# Patient Record
Sex: MALE | Race: WHITE
[De-identification: names, ages, dates, MRNs, and addresses within clinical notes are randomized per-mention and may not be internally consistent; named-entity substitution may affect disease eponyms.]

---

## 2020-01-04 ENCOUNTER — HOSPITAL ENCOUNTER (INPATIENT)
Dept: HOSPITAL 36 - GERO | Age: 70
LOS: 11 days | Discharge: NURSING, REHAB OR LONG-TERM CARE | DRG: 885 | End: 2020-01-15
Attending: PSYCHIATRY & NEUROLOGY | Admitting: PSYCHIATRY & NEUROLOGY
Payer: MEDICARE

## 2020-01-04 DIAGNOSIS — F29: ICD-10-CM

## 2020-01-04 DIAGNOSIS — Z59.0: ICD-10-CM

## 2020-01-04 DIAGNOSIS — F10.20: ICD-10-CM

## 2020-01-04 DIAGNOSIS — N40.0: ICD-10-CM

## 2020-01-04 DIAGNOSIS — L89.319: ICD-10-CM

## 2020-01-04 DIAGNOSIS — F33.2: Primary | ICD-10-CM

## 2020-01-04 DIAGNOSIS — K59.00: ICD-10-CM

## 2020-01-04 DIAGNOSIS — R45.851: ICD-10-CM

## 2020-01-04 PROCEDURE — Z7610: HCPCS

## 2020-01-04 PROCEDURE — X3904: HCPCS

## 2020-01-04 SDOH — ECONOMIC STABILITY - HOUSING INSECURITY: HOMELESSNESS: Z59.0

## 2020-01-05 NOTE — PROGRESS NOTES
DATE:  



HISTORY OF PRESENT ILLNESS:  The patient is a 69-year-old male transferred here

from [facility] on a 5150 for being danger to self.  The patient

endorsed suicidal ideations with plan to overdose on pills, cites a long history

of alcohol abuse and homelessness as primary source of distress.  The patient

states that he has been drinking for over 20 years and is unable to stop.



MENTAL STATUS EXAMINATION:

GENERAL APPEARANCE AND BEHAVIOR:  The patient is resting comfortably in bed, not

in acute distress.  Good eye contact, cooperative with interview.  Speech is

normal, rate, rhythm and tone.  Mood and affect:  The patient described his mood

as depressed and anxious.  Affect is congruent.  Thought process and thought

content linear and logical.  The patient reports to having suicidal ideations to

overdose on pills.  Insight and judgment is poor.



DIAGNOSTIC IMPRESSION:  Major depressive disorder, severe, recurrent, without

psychotic features and alcohol abuse.



PLAN:  We will recommend continuing the patient detox, monitor for any seizures

or DTs.





DD: 01/05/2020 17:20

DT: 01/05/2020 22:15

[facility]# [id number]  [id number]

## 2020-01-05 NOTE — HISTORY AND PHYSICAL
History of Present Illness





- HPI


Chief Complaint: 





Psychosis


HPI: 





68 y/o homeless man transferred to [facility] from 

[facility] for suicidal ideations, alcohol abuse, and 5150 hold, 

for danger to himself. While in the [facility] ER patient had 

initial labwork done





WBC 7.6


H/H 11.1/34.9


Plat 166K





Na  142


K 4.0


Bun 19


Cr 0.7


glu 96


AST 48


ALT 26


Alk zay 80


ETOH 375





CT head no acute bleed, deep white matter changes consistent with microvascular 

ischemic disease





Patient has a past medical history for bipolar disorder, alcoholism.


Vital Signs: 





 Last Vital Signs











Temp  99.1 F   01/05/20 06:12


 


Pulse  95   01/05/20 06:12


 


Resp  20   01/05/20 06:12


 


BP  134/81   01/05/20 06:12


 


Pulse Ox  96   01/05/20 06:12














Past Medical History


Cardiovascular: Report: No Pertinent Hx


Pulmonary: Report: No Pertinent Hx


CNS: Report: Dementia


Psych: Report: Anxiety, Bipolar


Musculoskeletal: Report: No Pertinent Hx


Rheumatologic: Report: No pertinent Hx


Infectious Disease: Report: No Pertinent Hx


Renal/: Report: No Pertinent Hx


Endocrine: Report: No Pertinent Hx


Dermatology: Report: No Pertinent Hx





- Past Surgical History


Past Surgical History: No pertinent Hx





Family Medical History





- Family Member


  ** Mother


History Unknown: Yes





  ** Father


History Unknown: Yes





Social History


Smoke: No


Alcohol: Heavy


Drugs: None


Lives: Homeless





- Allergies


Allergies/Adverse Reactions: 


 Allergies











Allergy/AdvReac Type Severity Reaction Status Date / Time


 


No Known Allergies Allergy   Verified 01/05/20 01:44














Review of Systems





- Review of Systems


Constitutional: Report: No Significant


Eyes: Report: No Significant


ENT: Report: No Significant


Respiratory: Report: No Significant


Cardiovascular: Report: No Significant


Gastrointestinal: Report: No Significant


Genitourinary: Report: No Significant


Musculoskeletal: Report: No Significant


Skin: Report: No Significant


Neurological: Report: No Significant





Physical Exam





- Physical Exam


HEENT: Report: Ears Nose Throat within normal limits, Pharnyx within normal 

limits


Neck: Report: Within normal limits


Cardiovascular Systems: Report: +s1/s2 noted, Regular, Rate and Rhythm


Respiratory: Report: Breath Sounds are within normal limits


Abdomen: Report: Non-tender to palpation


Back: Report: Inspection of back is within normal limits.


Extremities: Report: Non-tender to palpation.


Skin: Report: Color of skin is within normal limits


Neuro/Psych: Report: Mood affect is within normal limits





- Assessment


Assessment: 





5150


homeless


alcoholism


suicidal ideation


bipolar disorder


psychosis





- Plan


Plan: 





admit to [facility]

## 2020-01-06 RX ADMIN — CLONIDINE SCH PATCH: 0.1 PATCH TRANSDERMAL at 08:44

## 2020-01-06 NOTE — GENERAL PROGRESS NOTE
Subjective





- Review of Systems


Service Date: 01/06/20


Subjective: 





Patient is awake, alert. No acute distress. 





VS


T98.4


P 92


/86


R 20





Objective





- Physical Exam


Vitals and I&O: 


 Vital Signs











Temp  98.4 F   01/06/20 06:11


 


Pulse  92   01/06/20 06:11


 


Resp  20   01/06/20 06:11


 


BP  146/86   01/06/20 06:11


 


Pulse Ox  94   01/06/20 06:11








 Intake & Output











 01/05/20 01/06/20 01/06/20





 18:59 06:59 18:59


 


Intake Total 950 240 


 


Balance 950 240 


 


Intake:   


 


  Oral 950 240 


 


Other:   


 


  # Voids 3 2 


 


  # Bowel Movements 0  











Active Medications: 


Current Medications





Acetaminophen (Tylenol)  650 mg PO Q4HR PRN


   PRN Reason: Mild Pain / Temp above 100


   Stop: 03/05/20 01:44


Al Hydrox/Mg Hydrox/Simethicone (Maalox)  30 ml PO Q4HR PRN


   PRN Reason: GI DISTRESS


   Stop: 03/05/20 01:44


Lorazepam (Ativan)  0.5 mg PO Q4HR PRN; Protocol


   PRN Reason: Anxiety


   Stop: 02/04/20 01:44


   Last Admin: 01/05/20 08:26 Dose:  0.5 mg


Magnesium Hydroxide (Milk Of Magnesia)  30 ml PO HS PRN


   PRN Reason: Constipation


Multivitamins/Vitamin C (Theragran)  1 tab PO DAILY LUIS


   Stop: 03/05/20 08:59


   Last Admin: 01/05/20 08:26 Dose:  1 tab


Zolpidem Tartrate (Ambien)  5 mg PO HS PRN


   PRN Reason: Insomnia


   Stop: 03/05/20 01:44


   Last Admin: 01/05/20 20:31 Dose:  5 mg











Assessment/Plan





- Assessment


Assessment: 





5150


homeless


alcoholism


suicidal ideation


bipolar disorder


psychosis


BP elevated





- Plan


Plan: 





admit to [facility]





will add catapres patch

## 2020-01-07 NOTE — PROGRESS NOTES
DATE:  01/06/2020



SUBJECTIVE:  A 69-year-old male transferred from [facility],

endorsed suicidal ideations, plan to overdose on the pills.  A long history of

alcohol abuse, homelessness, drinking for over 20 years, concerns he may be

withdrawing from alcohol.  The patient very depressed ____ history of bipolar,

alcoholism, homelessness, very poor sleep for 3 weeks prior to coming into the

hospital, little family support.  Currently has an adult son.



No vitals were noted.  He does not seem to be having any tremors right now. 

Pulse rate is relatively stable, currently also on dosing of Ativan.  He does

not exactly know how much he was drinking, is a little bit unclear.



ASSESSMENT:  Ongoing suicidal ideation, despairing, suicidal, monitoring also

for any withdrawal symptoms from alcohol.  Continue Ativan as needed and the

clonidine patch.





DD: 01/06/2020 13:57

DT: 01/06/2020 21:13

JOB# [id number]  [id number]

## 2020-01-07 NOTE — PROGRESS NOTES
DATE:  01/07/2020



SUBJECTIVE:  The patient is still withdrawing from alcohol.  Mild tremors. 

Still depressed, hopeless, despairing, ongoing SI, psychological distress,

turmoil, mostly keeps to self, withdrawn.  We will initiate a 14-day hold.



ASSESSMENT:  The patient is homeless, despairing, ongoing symptoms, safety

concerns.





DD: 01/07/2020 15:42

DT: 01/07/2020 20:54

[facility]# [id number]  [id number]

## 2020-01-07 NOTE — GENERAL PROGRESS NOTE
Subjective





- Review of Systems


Service Date: 01/07/20


Subjective: 





Patient is awake, alert. No acute distress. 





VS


T98.6


P 81


/78


R 20





Objective





- Physical Exam


Vitals and I&O: 


 Vital Signs











Temp  98.6 F   01/07/20 05:48


 


Pulse  81   01/07/20 05:48


 


Resp  20   01/07/20 05:48


 


BP  138/78   01/07/20 05:48


 


Pulse Ox  97   01/07/20 05:48








 Intake & Output











 01/06/20 01/07/20 01/07/20





 18:59 06:59 18:59


 


Intake Total  240 


 


Balance  240 


 


Intake:   


 


  Oral  240 


 


Other:   


 


  # Voids 1 2 


 


  # Bowel Movements 2  











Active Medications: 


Current Medications





Acetaminophen (Tylenol)  650 mg PO Q4HR PRN


   PRN Reason: Mild Pain (Scale 1-3)


   Stop: 03/05/20 01:44


   Last Admin: 01/06/20 18:05 Dose:  650 mg


Acetaminophen (Tylenol)  650 mg PO Q4HR PRN


   PRN Reason: TEMP ABOVE 100


   Stop: 03/06/20 11:33


Al Hydrox/Mg Hydrox/Simethicone (Maalox)  30 ml PO Q4HR PRN


   PRN Reason: GI DISTRESS


   Stop: 03/05/20 01:44


Clonidine HCl (Catapres-Tts-1)  1 patch TD Mo LUIS


   Stop: 03/06/20 08:59


   Last Admin: 01/06/20 08:44 Dose:  1 patch


Lorazepam (Ativan)  0.5 mg PO Q4HR PRN; Protocol


   PRN Reason: Anxiety


   Stop: 02/04/20 01:44


   Last Admin: 01/06/20 21:27 Dose:  0.5 mg


Magnesium Hydroxide (Milk Of Magnesia)  30 ml PO HS PRN


   PRN Reason: Constipation


Multivitamins/Vitamin C (Theragran)  1 tab PO DAILY LUIS


   Stop: 03/05/20 08:59


   Last Admin: 01/06/20 08:44 Dose:  1 tab


Trazodone HCl (Desyrel)  50 mg PO HS LUIS; Protocol


   Stop: 03/06/20 20:59


   Last Admin: 01/06/20 21:27 Dose:  50 mg








General: Alert, Oriented x3, No acute distress


HEENT: Atraumatic, PERRLA, EOMI


Neck: Supple


Cardiovascular: Regular rate


Lungs: Clear to auscultation


Abdomen: Bowel sounds, Soft, no Distended


Extremities: no Clubbing, no Cyanosis, no Edema





Assessment/Plan





- Assessment


Assessment: 





5150


homeless


alcoholism


suicidal ideation


bipolar disorder


psychosis


BP improved





- Plan


Plan: 





admit to [facility]





will add catapres patch

## 2020-01-08 NOTE — GENERAL PROGRESS NOTE
Subjective





- Review of Systems


Service Date: 01/08/20


Subjective: 





Patient is awake, alert. No acute distress. 





VS


T98.6


P 83


/80


R 18





Objective





- Physical Exam


Vitals and I&O: 


 Vital Signs











Temp  98.7 F   01/08/20 06:30


 


Pulse  83   01/08/20 06:30


 


Resp  18   01/08/20 06:30


 


BP  143/80   01/08/20 06:30


 


Pulse Ox  96   01/08/20 06:30








 Intake & Output











 01/07/20 01/08/20 01/08/20





 18:59 06:59 18:59


 


Intake Total 800 120 


 


Balance 800 120 


 


Intake:   


 


  Oral 800 120 


 


Other:   


 


  # Voids  3 











Active Medications: 


Current Medications





Acetaminophen (Tylenol)  650 mg PO Q4HR PRN


   PRN Reason: Mild Pain (Scale 1-3)


   Stop: 03/05/20 01:44


   Last Admin: 01/07/20 14:58 Dose:  650 mg


Acetaminophen (Tylenol)  650 mg PO Q4HR PRN


   PRN Reason: TEMP ABOVE 100


   Stop: 03/06/20 11:33


Al Hydrox/Mg Hydrox/Simethicone (Maalox)  30 ml PO Q4HR PRN


   PRN Reason: GI DISTRESS


   Stop: 03/05/20 01:44


Clonidine HCl (Catapres-Tts-1)  1 patch TD Mo LUIS


   Stop: 03/06/20 08:59


   Last Admin: 01/06/20 08:44 Dose:  1 patch


Lorazepam (Ativan)  0.5 mg PO Q4HR PRN; Protocol


   PRN Reason: Anxiety


   Stop: 02/04/20 01:44


   Last Admin: 01/06/20 21:27 Dose:  0.5 mg


Magnesium Hydroxide (Milk Of Magnesia)  30 ml PO HS PRN


   PRN Reason: Constipation


Multivitamins/Vitamin C (Theragran)  1 tab PO DAILY LUIS


   Stop: 03/05/20 08:59


   Last Admin: 01/07/20 08:54 Dose:  1 tab


Trazodone HCl (Desyrel)  50 mg PO HS LUIS; Protocol


   Stop: 03/06/20 20:59


   Last Admin: 01/07/20 20:18 Dose:  50 mg








General: Alert, Oriented x3, No acute distress


HEENT: Atraumatic, PERRLA, EOMI


Neck: Supple


Cardiovascular: Regular rate


Lungs: Clear to auscultation


Abdomen: Bowel sounds, Soft, no Distended


Extremities: no Clubbing, no Cyanosis, no Edema





Assessment/Plan





- Assessment


Assessment: 





5150


homeless


alcoholism


suicidal ideation


bipolar disorder


psychosis


BP elevated today





- Plan


Plan: 





admit to [facility]





will add catapres patch and clonidine PO q8 PRN SBP above 150

## 2020-01-09 NOTE — PROGRESS NOTES
DATE:  



The patient seen, chart reviewed, discussed with staff.  The patient seems to be

withdrawing from alcohol without any complications.  No overt tremors noted. 

Still feeling "weak," erratic sleep, feeling somewhat anxious, also anxious

that he has a medical problem, is homeless, nowhere to go, having a hard time

walking.  Mostly withdrawn, keeps to self, ongoing symptoms, psychological

distress, turmoil.  The patient noting he has a history of taking Lamictal,

BuSpar.  We will continue to monitor.  The patient will likely need a place to

go.  Also having a hard time sleeping.  We will make appropriate medication

adjustments.





DD: 01/08/2020 17:34

DT: 01/09/2020 03:07

JOB# [id number]  [id number]

## 2020-01-09 NOTE — GENERAL PROGRESS NOTE
Subjective





- Review of Systems


Service Date: 20


Subjective: 





Patient is awake, alert. No acute distress. Currently on wound care treatment. 

Poor PO intake 





VS


T97.4


P 76


/78


R 18





Objective





- Physical Exam


Vitals and I&O: 


 Vital Signs











Temp  97.4 F   20 06:47


 


Pulse  76   20 06:47


 


Resp  18   20 06:47


 


BP  139/78   20 06:47


 


Pulse Ox  95   20 06:47








 Intake & Output











 20





 18:59 06:59 18:59


 


Intake Total  240 


 


Output Total  1 


 


Balance  239 


 


Intake:   


 


  Oral  240 


 


Output:   


 


  Urine/Stool Mix  1 


 


Other:   


 


  # Voids  2 











Active Medications: 


Current Medications





Acetaminophen (Tylenol)  650 mg PO Q4HR PRN


   PRN Reason: Mild Pain (Scale 1-3)


   Stop: 20 01:44


   Last Admin: 20 21:41 Dose:  650 mg


Acetaminophen (Tylenol)  650 mg PO Q4HR PRN


   PRN Reason: TEMP ABOVE 100


   Stop: 20 11:33


Al Hydrox/Mg Hydrox/Simethicone (Maalox)  30 ml PO Q4HR PRN


   PRN Reason: GI DISTRESS


   Stop: 20 01:44


Clonidine HCl (Catapres-Tts-1)  1 patch TD Mo LUIS


   Stop: 20 08:59


   Last Admin: 20 08:44 Dose:  1 patch


Lorazepam (Ativan)  0.5 mg PO Q4HR PRN; Protocol


   PRN Reason: Anxiety


   Stop: 20 01:44


   Last Admin: 20 21:27 Dose:  0.5 mg


Magnesium Hydroxide (Milk Of Magnesia)  30 ml PO HS PRN


   PRN Reason: Constipation


Multivitamins/Vitamin C (Theragran)  1 tab PO DAILY LUIS


   Stop: 20 08:59


   Last Admin: 20 08:14 Dose:  1 tab


Trazodone HCl (Desyrel)  50 mg PO HS LUIS; Protocol


   Stop: 20 20:59


   Last Admin: 20 20:45 Dose:  50 mg








General: Alert, Oriented x3, No acute distress


HEENT: Atraumatic, PERRLA, EOMI


Neck: Supple


Cardiovascular: Regular rate


Lungs: Clear to auscultation


Abdomen: Bowel sounds, Soft, no Distended


Extremities: no Clubbing, no Cyanosis, no Edema





Assessment/Plan





- Assessment


Assessment: 





5150


homeless


alcoholism


suicidal ideation


bipolar disorder


psychosis


BP elevated today


Pressure ulcers -- Right Buttocks





- Plan


Plan: 





[facility]





will add catapres patch and clonidine PO q8 PRN SBP above 150





Wound care treatment. will add doxycycline PO





Nutritional Asmnt/Malnutr-PDOC





- Dietary Evaluation


Malnutrition Findings (Please click <Entered> for more info): 








Nutritional Asmnt/Malnutrition                             Start:  20 09:

34


Text:                                                      Status: Complete    

  


Freq:                                                                          

  


Protocol:                                                                      

  


 Document     20 09:39  JERI  (Rec: 20 10:01  JERI OCONNELL-

FNS1)


 Nutritional Asmnt/Malnutrition


     Patient General Information


      Nutritional Screening                      Low Risk


      Diagnosis                                  Psychosis


      Pertinent Medical Hx/Surgical Hx           Dementia, anxietry, bipolar


      Subjective Information                     Per H&P, patient is homeless


                                                 and admitted from


                                                 [facility] for


                                                 suicidal ideations, alcohol


                                                 abuse and 5150 hold for danger


                                                 to himself. Per RN, patient


                                                 not eating much, will often


                                                 only eat a cookie. She states


                                                 he mostly just drinks alcohol


                                                 at home and doesn't eat much.


                                                 Patient requesting strawberry


                                                 flavored protein supplements.


      Current Diet Order/ Nutrition Support      Regular


      Patient / S.O                              Not Indicated


      Pertinent Medications                      maalox, MOM, Theragran


      Pertinent Labs                             No current labs


     Nutritional Hx/Data


      Height                                     1.68 m


      Height (Calculated Centimeters)            167.6


      Current Weight (lbs)                       62.142 kg


      Weight (Calculated Kilograms)              62.1


      Weight (Calculated Grams)                  12875.2


      Ideal Body Weight                          142


      % Ideal Body Weight                        96


      Body Mass Index (BMI)                      22.1


      Recent Weight Change                       No


      Weight Status                              Approriate


     GI Symptoms


      GI Symptoms                                None


      Last BM                                    1/6 x 2


      Difficult in:                              None


      Food Allergies                             No


      Cultural/Ethnic/Sikh Belief           none indicated


      Usual diet at home                         regular (pt homeless)


      Skin Integrity/Comment:                    Anders Soliman, per wound care


                                                 notes, skin tear on right


                                                 outer buttock, right upper


                                                 pelvic rim and left upper


                                                 pelvic rim. Chronic wound on


                                                 right knee.


      Current %PO                                Poor (25-49%)


     Estimated Nutritional Goals


      BEE in Kcals:                              Using Current wt


      Calories/Kcals/Kg                          62.2kg 25-30 kcal/kg


      Kcals Calculated                           ~2083-1245 kcal/day


      Protein:                                   Using Current wt


      Protein g/k-1.2 gm/kg


      Protein Calculated                         60-75 gm/day


      Fluid: ml                                  ~0222-8146 ml/day (1 ml/kcal)


     Nutritional Problem


      1. Problem


       Problem                                   Inadequate oral intake related


                                                 to


       Etiology                                  possible poor appetite/


                                                 confusion aeb


       Signs/Symptoms:                           PO intake ~25% of meals


     Intervention/Recommendation


      Comments                                   1. Continue regular diet as


                                                 tolerated by patient


                                                 2. Encourage oral intake and


                                                 provide assistance with meals


                                                 as needed.


                                                 3. Provide Ensure Enlive (


                                                 Strawberry) with each meal to


                                                 optimize calorie/protein


                                                 intake.


                                                 4 Continue MVI supplementation


                                                 due to impaired skin


                                                 integrity.


     Expected Outcomes/Goals


      Expected Outcomes/Goals                    oral intake >75% of meals,


                                                 weight stable, nutrition


                                                 related labs WNL, improved


                                                 skin integrity


                                                 F/U MR

## 2020-01-10 NOTE — PROGRESS NOTES
DATE:  01/10/2020



SUBJECTIVE:  The patient in the hospital.  No alcohol withdrawal symptoms,

depressed, withdrawn, weak on exam.  We are working on placement, trying to get

him over to [facility].  Unclear if they can accept the patient.  The patient

really cannot take care of himself.  He is just frankly too weak.



PLAN:  We will continue to monitor ongoing concerns about his ability to take

care for his basic needs.





DD: 01/10/2020 13:28

DT: 01/10/2020 22:56

[facility]# [id number]  [id number]

## 2020-01-10 NOTE — GENERAL PROGRESS NOTE
Subjective





- Review of Systems


Service Date: 01/10/20


Subjective: 





Patient is awake, alert. No acute distress. Currently on wound care treatment. 

Poor PO intake 





VS


T98.6


P 83


/71


R 18





Objective





- Physical Exam


Vitals and I&O: 


 Vital Signs











Temp  98.6 F   01/10/20 06:21


 


Pulse  83   01/10/20 06:21


 


Resp  18   01/10/20 06:21


 


BP  146/71   01/10/20 06:21


 


Pulse Ox  99   01/10/20 06:21








 Intake & Output











 01/09/20 01/10/20 01/10/20





 18:59 06:59 18:59


 


Intake Total 1200 240 


 


Output Total  1 


 


Balance 1200 239 


 


Intake:   


 


  Oral 1200 240 


 


Output:   


 


  Urine/Stool Mix  1 


 


Other:   


 


  # Voids  1 


 


  # Bowel Movements  1 











Active Medications: 


Current Medications





Acetaminophen (Tylenol)  650 mg PO Q4HR PRN


   PRN Reason: Mild Pain (Scale 1-3)


   Stop: 20 01:44


   Last Admin: 20 17:09 Dose:  650 mg


Acetaminophen (Tylenol)  650 mg PO Q4HR PRN


   PRN Reason: TEMP ABOVE 100


   Stop: 20 11:33


Al Hydrox/Mg Hydrox/Simethicone (Maalox)  30 ml PO Q4HR PRN


   PRN Reason: GI DISTRESS


   Stop: 20 01:44


Clonidine HCl (Catapres-Tts-1)  1 patch TD Mo LUIS


   Stop: 20 08:59


   Last Admin: 20 08:44 Dose:  1 patch


Doxycycline Hyclate (Vibramycin)  100 mg PO Q12HR LUIS


   Stop: 20 08:59


   Last Admin: 20 21:45 Dose:  100 mg


Lorazepam (Ativan)  0.5 mg PO Q4HR PRN; Protocol


   PRN Reason: Anxiety


   Stop: 20 01:44


   Last Admin: 20 21:27 Dose:  0.5 mg


Magnesium Hydroxide (Milk Of Magnesia)  30 ml PO HS PRN


   PRN Reason: Constipation


Multivitamins/Vitamin C (Theragran)  1 tab PO DAILY LUIS


   Stop: 20 08:59


   Last Admin: 20 09:11 Dose:  1 tab


Trazodone HCl (Desyrel)  50 mg PO HS LUIS; Protocol


   Stop: 20 20:59


   Last Admin: 20 21:45 Dose:  50 mg








General: Alert, Oriented x3, No acute distress


HEENT: Atraumatic, PERRLA, EOMI


Neck: Supple


Cardiovascular: Regular rate


Lungs: Clear to auscultation


Abdomen: Bowel sounds, Soft, no Distended


Extremities: no Clubbing, no Cyanosis, no Edema





Assessment/Plan





- Assessment


Assessment: 





5150


homeless


alcoholism


suicidal ideation


bipolar disorder


psychosis


BP stable today


Pressure ulcers -- Right Buttocks





- Plan


Plan: 





admit to geropsyche





will add catapres patch and clonidine PO q8 PRN SBP above 150





Wound care treatment. will add doxycycline PO





Nutritional Asmnt/Malnutr-PDOC





- Dietary Evaluation


Malnutrition Findings (Please click <Entered> for more info): 








Nutritional Asmnt/Malnutrition                             Start:  20 09:

34


Text:                                                      Status: Complete    

  


Freq:                                                                          

  


Protocol:                                                                      

  


 Document     20 09:39  JERI  (Rec: 20 10:01  JERI OCONNELL-

FNS1)


 Nutritional Asmnt/Malnutrition


     Patient General Information


      Nutritional Screening                      Low Risk


      Diagnosis                                  Psychosis


      Pertinent Medical Hx/Surgical Hx           Dementia, anxietry, bipolar


      Subjective Information                     Per H&P, patient is homeless


                                                 and admitted from


                                                 [facility] for


                                                 suicidal ideations, alcohol


                                                 abuse and 5150 hold for danger


                                                 to himself. Per RN, patient


                                                 not eating much, will often


                                                 only eat a cookie. She states


                                                 he mostly just drinks alcohol


                                                 at home and doesn't eat much.


                                                 Patient requesting strawberry


                                                 flavored protein supplements.


      Current Diet Order/ Nutrition Support      Regular


      Patient / S.O                              Not Indicated


      Pertinent Medications                      maalox, MOM, Theragran


      Pertinent Labs                             No current labs


     Nutritional Hx/Data


      Height                                     1.68 m


      Height (Calculated Centimeters)            167.6


      Current Weight (lbs)                       62.142 kg


      Weight (Calculated Kilograms)              62.1


      Weight (Calculated Grams)                  36860.2


      Ideal Body Weight                          142


      % Ideal Body Weight                        96


      Body Mass Index (BMI)                      22.1


      Recent Weight Change                       No


      Weight Status                              Approriate


     GI Symptoms


      GI Symptoms                                None


      Last BM                                    1/6 x 2


      Difficult in:                              None


      Food Allergies                             No


      Cultural/Ethnic/Taoist Belief           none indicated


      Usual diet at home                         regular (pt homeless)


      Skin Integrity/Comment:                    Andres Soliman, per wound care


                                                 notes, skin tear on right


                                                 outer buttock, right upper


                                                 pelvic rim and left upper


                                                 pelvic rim. Chronic wound on


                                                 right knee.


      Current %PO                                Poor (25-49%)


     Estimated Nutritional Goals


      BEE in Kcals:                              Using Current wt


      Calories/Kcals/Kg                          62.2kg 25-30 kcal/kg


      Kcals Calculated                           ~5359-9692 kcal/day


      Protein:                                   Using Current wt


      Protein g/k-1.2 gm/kg


      Protein Calculated                         60-75 gm/day


      Fluid: ml                                  ~1683-4595 ml/day (1 ml/kcal)


     Nutritional Problem


      1. Problem


       Problem                                   Inadequate oral intake related


                                                 to


       Etiology                                  possible poor appetite/


                                                 confusion aeb


       Signs/Symptoms:                           PO intake ~25% of meals


     Intervention/Recommendation


      Comments                                   1. Continue regular diet as


                                                 tolerated by patient


                                                 2. Encourage oral intake and


                                                 provide assistance with meals


                                                 as needed.


                                                 3. Provide Ensure Enlive (


                                                 Strawberry) with each meal to


                                                 optimize calorie/protein


                                                 intake.


                                                 4 Continue MVI supplementation


                                                 due to impaired skin


                                                 integrity.


     Expected Outcomes/Goals


      Expected Outcomes/Goals                    oral intake >75% of meals,


                                                 weight stable, nutrition


                                                 related labs WNL, improved


                                                 skin integrity


                                                 F/U MR

## 2020-01-10 NOTE — PROGRESS NOTES
DATE:  01/09/2020



SUBJECTIVE:  A 69-year-old male, calm on exam, withdrawn.  No alcohol withdrawal

signs or symptoms.  He is pretty depressed on exam, melancholic.  Concerns about

anxiety, severe depression.  He also has nowhere to go.  We are attempting to

find him a safe place, a safe discharge plan.  Vitals were noted.  Mostly keeps

to self, self isolation.  We will continue to monitor.





DD: 01/09/2020 17:10

DT: 01/09/2020 23:52

[facility]# [id number]  [id number]

## 2020-01-11 NOTE — PROGRESS NOTES
DATE:  01/11/2020



Covering for Dr. Gonzalez.



IDENTIFYING DATA:  A 69-year-old male with history of suicidal ideation with

plan to overdose.



Today on face-to-face evaluation, continues to present withdrawn, sad.  Alcohol

withdrawal symptoms noted by the nursing staff.



MENTAL STATUS EXAMINATION:  Still sad, withdrawn, disengaged.



ASSESSMENT AND PLAN:  Major depressive disorder.  We will continue with primary

psychiatrist's treatment plan and goals, which includes monitoring for

withdrawal symptoms.





DD: 01/11/2020 10:50

DT: 01/11/2020 20:06

[facility]# [id number]  [id number]

## 2020-01-11 NOTE — GENERAL PROGRESS NOTE
Subjective





- Review of Systems


Service Date: 20


Subjective: 





Patient is awake, alert. No acute distress. Currently on wound care treatment. 

Poor PO intake 





VS


T97.6


P 74


/80


R 19





Objective





- Physical Exam


Vitals and I&O: 


 Vital Signs











Temp  97.6 F   20 06:24


 


Pulse  74   20 06:24


 


Resp  19   20 06:24


 


BP  152/80   20 06:24


 


Pulse Ox  94   20 06:24








 Intake & Output











 01/10/20 01/11/20 01/11/20





 18:59 06:59 18:59


 


Intake Total 960 300 


 


Output Total  1 


 


Balance 960 299 


 


Intake:   


 


  Oral 960 300 


 


Output:   


 


  Urine/Stool Mix  1 


 


Other:   


 


  # Voids 4 1 


 


  # Bowel Movements 1 0 











Active Medications: 


Current Medications





Acetaminophen (Tylenol)  650 mg PO Q4HR PRN


   PRN Reason: Mild Pain (Scale 1-3)


   Stop: 20 01:44


   Last Admin: 20 17:09 Dose:  650 mg


Acetaminophen (Tylenol)  650 mg PO Q4HR PRN


   PRN Reason: TEMP ABOVE 100


   Stop: 20 11:33


Al Hydrox/Mg Hydrox/Simethicone (Maalox)  30 ml PO Q4HR PRN


   PRN Reason: GI DISTRESS


   Stop: 20 01:44


Clonidine HCl (Catapres-Tts-1)  1 patch TD Mo LUIS


   Stop: 20 08:59


   Last Admin: 20 08:44 Dose:  1 patch


Doxycycline Hyclate (Vibramycin)  100 mg PO Q12HR LUIS


   Stop: 20 08:59


   Last Admin: 01/10/20 21:12 Dose:  100 mg


Lorazepam (Ativan)  0.5 mg PO Q4HR PRN; Protocol


   PRN Reason: Anxiety


   Stop: 20 01:44


   Last Admin: 20 21:27 Dose:  0.5 mg


Magnesium Hydroxide (Milk Of Magnesia)  30 ml PO HS PRN


   PRN Reason: Constipation


Multivitamins/Vitamin C (Theragran)  1 tab PO DAILY LUIS


   Stop: 20 08:59


   Last Admin: 01/10/20 09:18 Dose:  1 tab


Trazodone HCl (Desyrel)  50 mg PO HS LUIS; Protocol


   Stop: 20 20:59


   Last Admin: 01/10/20 21:13 Dose:  50 mg








General: Alert, Oriented x3, No acute distress


HEENT: Atraumatic, PERRLA, EOMI


Neck: Supple


Cardiovascular: Regular rate


Lungs: Clear to auscultation


Abdomen: Bowel sounds, Soft, no Distended


Extremities: no Clubbing, no Cyanosis, no Edema





Assessment/Plan





- Assessment


Assessment: 





5150


homeless


alcoholism


suicidal ideation


bipolar disorder


psychosis


BP stable today


Pressure ulcers -- Right Buttocks





- Plan


Plan: 





admit to geropsyche





will add catapres patch and clonidine PO q8 PRN SBP above 150





Wound care treatment. will add doxycycline PO





Nutritional Asmnt/Malnutr-PDOC





- Dietary Evaluation


Malnutrition Findings (Please click <Entered> for more info): 








Nutritional Asmnt/Malnutrition                             Start:  20 09:

34


Text:                                                      Status: Complete    

  


Freq:                                                                          

  


Protocol:                                                                      

  


 Document     20 09:39  JERI  (Rec: 20 10:01  JERI OCONNELL-

FNS1)


 Nutritional Asmnt/Malnutrition


     Patient General Information


      Nutritional Screening                      Low Risk


      Diagnosis                                  Psychosis


      Pertinent Medical Hx/Surgical Hx           Dementia, anxietry, bipolar


      Subjective Information                     Per H&P, patient is homeless


                                                 and admitted from


                                                 [facility] for


                                                 suicidal ideations, alcohol


                                                 abuse and 5150 hold for danger


                                                 to himself. Per RN, patient


                                                 not eating much, will often


                                                 only eat a cookie. She states


                                                 he mostly just drinks alcohol


                                                 at home and doesn't eat much.


                                                 Patient requesting strawberry


                                                 flavored protein supplements.


      Current Diet Order/ Nutrition Support      Regular


      Patient / S.O                              Not Indicated


      Pertinent Medications                      maalox, MOM, Theragran


      Pertinent Labs                             No current labs


     Nutritional Hx/Data


      Height                                     1.68 m


      Height (Calculated Centimeters)            167.6


      Current Weight (lbs)                       62.142 kg


      Weight (Calculated Kilograms)              62.1


      Weight (Calculated Grams)                  03746.2


      Ideal Body Weight                          142


      % Ideal Body Weight                        96


      Body Mass Index (BMI)                      22.1


      Recent Weight Change                       No


      Weight Status                              Approriate


     GI Symptoms


      GI Symptoms                                None


      Last BM                                    1/6 x 2


      Difficult in:                              None


      Food Allergies                             No


      Cultural/Ethnic/Anabaptism Belief           none indicated


      Usual diet at home                         regular (pt homeless)


      Skin Integrity/Comment:                    Andres Soliman, per wound care


                                                 notes, skin tear on right


                                                 outer buttock, right upper


                                                 pelvic rim and left upper


                                                 pelvic rim. Chronic wound on


                                                 right knee.


      Current %PO                                Poor (25-49%)


     Estimated Nutritional Goals


      BEE in Kcals:                              Using Current wt


      Calories/Kcals/Kg                          62.2kg 25-30 kcal/kg


      Kcals Calculated                           ~0503-3697 kcal/day


      Protein:                                   Using Current wt


      Protein g/k-1.2 gm/kg


      Protein Calculated                         60-75 gm/day


      Fluid: ml                                  ~4413-6957 ml/day (1 ml/kcal)


     Nutritional Problem


      1. Problem


       Problem                                   Inadequate oral intake related


                                                 to


       Etiology                                  possible poor appetite/


                                                 confusion aeb


       Signs/Symptoms:                           PO intake ~25% of meals


     Intervention/Recommendation


      Comments                                   1. Continue regular diet as


                                                 tolerated by patient


                                                 2. Encourage oral intake and


                                                 provide assistance with meals


                                                 as needed.


                                                 3. Provide Ensure Enlive (


                                                 Strawberry) with each meal to


                                                 optimize calorie/protein


                                                 intake.


                                                 4 Continue MVI supplementation


                                                 due to impaired skin


                                                 integrity.


     Expected Outcomes/Goals


      Expected Outcomes/Goals                    oral intake >75% of meals,


                                                 weight stable, nutrition


                                                 related labs WNL, improved


                                                 skin integrity


                                                 F/U MR

## 2020-01-12 NOTE — GENERAL PROGRESS NOTE
Subjective





- Review of Systems


Service Date: 20


Subjective: 





Patient is awake, alert. No acute distress. Currently on wound care treatment. 

Poor PO intake 





VS


T97.8


P 70


/85


R 18





Objective





- Physical Exam


Vitals and I&O: 


 Vital Signs











Temp  97.8 F   20 06:42


 


Pulse  70   20 06:42


 


Resp  18   20 06:42


 


BP  133/85   20 06:42


 


Pulse Ox  96   20 06:42








 Intake & Output











 20





 18:59 06:59 18:59


 


Intake Total 860 60 


 


Balance 860 60 


 


Intake:   


 


  Oral 860 60 


 


Other:   


 


  # Voids 3 2 


 


  # Bowel Movements 0 0 











Active Medications: 


Current Medications





Acetaminophen (Tylenol)  650 mg PO Q4HR PRN


   PRN Reason: Mild Pain (Scale 1-3)


   Stop: 20 01:44


   Last Admin: 20 18:10 Dose:  650 mg


Acetaminophen (Tylenol)  650 mg PO Q4HR PRN


   PRN Reason: TEMP ABOVE 100


   Stop: 20 11:33


Al Hydrox/Mg Hydrox/Simethicone (Maalox)  30 ml PO Q4HR PRN


   PRN Reason: GI DISTRESS


   Stop: 20 01:44


Clonidine HCl (Catapres-Tts-1)  1 patch TD Mo LUIS


   Stop: 20 08:59


   Last Admin: 20 08:44 Dose:  1 patch


Doxycycline Hyclate (Vibramycin)  100 mg PO Q12HR LUIS


   Stop: 20 08:59


   Last Admin: 20 21:11 Dose:  100 mg


Lorazepam (Ativan)  0.5 mg PO Q4HR PRN; Protocol


   PRN Reason: Anxiety


   Stop: 20 01:44


   Last Admin: 20 21:27 Dose:  0.5 mg


Multivitamins/Vitamin C (Theragran)  1 tab PO DAILY LUIS


   Stop: 20 08:59


   Last Admin: 20 08:58 Dose:  1 tab


Trazodone HCl (Desyrel)  50 mg PO HS LUIS; Protocol


   Stop: 20 20:59


   Last Admin: 20 21:11 Dose:  50 mg








General: Alert, Oriented x3, No acute distress


HEENT: Atraumatic, PERRLA, EOMI


Neck: Supple


Cardiovascular: Regular rate


Lungs: Clear to auscultation


Abdomen: Bowel sounds, Soft, no Distended


Extremities: no Clubbing, no Cyanosis, no Edema





Assessment/Plan





- Assessment


Assessment: 





5150


homeless


alcoholism


suicidal ideation


bipolar disorder


psychosis


BP stable today


Pressure ulcers -- Right Buttocks ... continue current treatment. 





- Plan


Plan: 





admit to geropsyche





will add catapres patch and clonidine PO q8 PRN SBP above 150





Wound care treatment. will add doxycycline PO





Nutritional Asmnt/Malnutr-PDOC





- Dietary Evaluation


Malnutrition Findings (Please click <Entered> for more info): 








Nutritional Asmnt/Malnutrition                             Start:  20 09:

34


Text:                                                      Status: Complete    

  


Freq:                                                                          

  


Protocol:                                                                      

  


 Document     20 09:39  MMMARISEL  (Rec: 20 10:01  MMMARISEL OCONNELL-

FNS1)


 Nutritional Asmnt/Malnutrition


     Patient General Information


      Nutritional Screening                      Low Risk


      Diagnosis                                  Psychosis


      Pertinent Medical Hx/Surgical Hx           Dementia, anxietry, bipolar


      Subjective Information                     Per H&P, patient is homeless


                                                 and admitted from


                                                 [facility] for


                                                 suicidal ideations, alcohol


                                                 abuse and 5150 hold for danger


                                                 to himself. Per RN, patient


                                                 not eating much, will often


                                                 only eat a cookie. She states


                                                 he mostly just drinks alcohol


                                                 at home and doesn't eat much.


                                                 Patient requesting strawberry


                                                 flavored protein supplements.


      Current Diet Order/ Nutrition Support      Regular


      Patient / S.O                              Not Indicated


      Pertinent Medications                      maalox, MOM, Theragran


      Pertinent Labs                             No current labs


     Nutritional Hx/Data


      Height                                     1.68 m


      Height (Calculated Centimeters)            167.6


      Current Weight (lbs)                       62.142 kg


      Weight (Calculated Kilograms)              62.1


      Weight (Calculated Grams)                  09761.2


      Ideal Body Weight                          142


      % Ideal Body Weight                        96


      Body Mass Index (BMI)                      22.1


      Recent Weight Change                       No


      Weight Status                              Approriate


     GI Symptoms


      GI Symptoms                                None


      Last BM                                    1/6 x 2


      Difficult in:                              None


      Food Allergies                             No


      Cultural/Ethnic/Congregational Belief           none indicated


      Usual diet at home                         regular (pt homeless)


      Skin Integrity/Comment:                    Andres Soliman, per wound care


                                                 notes, skin tear on right


                                                 outer buttock, right upper


                                                 pelvic rim and left upper


                                                 pelvic rim. Chronic wound on


                                                 right knee.


      Current %PO                                Poor (25-49%)


     Estimated Nutritional Goals


      BEE in Kcals:                              Using Current wt


      Calories/Kcals/Kg                          62.2kg 25-30 kcal/kg


      Kcals Calculated                           ~9957-8326 kcal/day


      Protein:                                   Using Current wt


      Protein g/k-1.2 gm/kg


      Protein Calculated                         60-75 gm/day


      Fluid: ml                                  ~2905-4501 ml/day (1 ml/kcal)


     Nutritional Problem


      1. Problem


       Problem                                   Inadequate oral intake related


                                                 to


       Etiology                                  possible poor appetite/


                                                 confusion aeb


       Signs/Symptoms:                           PO intake ~25% of meals


     Intervention/Recommendation


      Comments                                   1. Continue regular diet as


                                                 tolerated by patient


                                                 2. Encourage oral intake and


                                                 provide assistance with meals


                                                 as needed.


                                                 3. Provide Ensure Enlive (


                                                 Strawberry) with each meal to


                                                 optimize calorie/protein


                                                 intake.


                                                 4 Continue MVI supplementation


                                                 due to impaired skin


                                                 integrity.


     Expected Outcomes/Goals


      Expected Outcomes/Goals                    oral intake >75% of meals,


                                                 weight stable, nutrition


                                                 related labs WNL, improved


                                                 skin integrity


                                                 F/U MR

## 2020-01-12 NOTE — PROGRESS NOTES
DATE:  01/12/2020



Covering for Dr. Gonzalez.



Today on face-to-face evaluation, he is observed to be avoiding disengage.  No

withdrawal symptoms reported in the last 24 hours.



MENTAL STATUS EXAMINATION:  Disengaged, withdrawn, melancholic.



ASSESSMENT AND PLAN:  Major depressive disorder, severe.  We will continue with

primary psychiatrist's treatment plan and goals and to monitor for residual

symptoms.





DD: 01/12/2020 07:11

DT: 01/12/2020 08:23

[facility]# [id number]  [id number]

## 2020-01-13 RX ADMIN — CLONIDINE SCH PATCH: 0.1 PATCH TRANSDERMAL at 09:05

## 2020-01-13 NOTE — DIAGNOSTIC IMAGING REPORT
KUB abdominal film



HISTORY: Pain, abdominal distention



The exam demonstrates nondilated bowel loops which appear to be markedly

displaced upward out of the pelvis.  Suggestion of density over the

lower abdomen and pelvis.  Findings may be related to a severely

distended urinary bladder.  Clinical correlation and correlation with

urine output needed.  An ultrasound exam would provide additional

assessment.



No free intraperitoneal air.  No abnormal calcifications.  Degenerative

changes seen to the spine.



IMPRESSION:

1.  Nondilated bowel displaced into the upper abdomen.  Findings may be

related to a severely distended urinary bladder.  Clinical correlation

and correlation with urine output needed.  An ultrasound exam would

provide additional assessment if needed.

## 2020-01-13 NOTE — GENERAL PROGRESS NOTE
Subjective





- Review of Systems


Service Date: 20


Subjective: 





Patient is awake, alert. No acute distress. Currently on wound care treatment. 

Poor PO intake 





VS


T 98.2


P 79


/84


R 20





Objective





- Physical Exam


Vitals and I&O: 


 Vital Signs











Temp  98.2 F   20 06:03


 


Pulse  79   20 06:03


 


Resp  18   20 07:36


 


BP  132/84   20 06:03


 


Pulse Ox  96   20 06:03








 Intake & Output











 20





 18:59 06:59 18:59


 


Intake Total  240 


 


Balance  240 


 


Intake:   


 


  Oral  240 


 


Other:   


 


  # Voids 2 2 


 


  # Bowel Movements 6  











Active Medications: 


Current Medications





Acetaminophen (Tylenol)  650 mg PO Q4HR PRN


   PRN Reason: Mild Pain (Scale 1-3)


   Stop: 20 01:44


   Last Admin: 20 06:26 Dose:  650 mg


Acetaminophen (Tylenol)  650 mg PO Q4HR PRN


   PRN Reason: TEMP ABOVE 100


   Stop: 20 11:33


Al Hydrox/Mg Hydrox/Simethicone (Maalox)  30 ml PO Q4HR PRN


   PRN Reason: GI DISTRESS


   Stop: 20 01:44


Clonidine HCl (Catapres-Tts-1)  1 patch TD Mo LUIS


   Stop: 20 08:59


   Last Admin: 20 08:44 Dose:  1 patch


Doxycycline Hyclate (Vibramycin)  100 mg PO Q12HR LUIS


   Stop: 20 08:59


   Last Admin: 20 21:11 Dose:  100 mg


Lorazepam (Ativan)  0.5 mg PO Q4HR PRN; Protocol


   PRN Reason: Anxiety


   Stop: 20 01:44


   Last Admin: 20 21:27 Dose:  0.5 mg


Multivitamins/Vitamin C (Theragran)  1 tab PO DAILY LUIS


   Stop: 20 08:59


   Last Admin: 20 08:57 Dose:  1 tab


Trazodone HCl (Desyrel)  50 mg PO HS LUIS; Protocol


   Stop: 20 20:59


   Last Admin: 20 21:11 Dose:  50 mg








General: Alert, Oriented x3, No acute distress


HEENT: Atraumatic, PERRLA, EOMI


Neck: Supple


Cardiovascular: Regular rate


Lungs: Clear to auscultation


Abdomen: Bowel sounds, Soft, no Distended


Extremities: no Clubbing, no Cyanosis, no Edema





Assessment/Plan





- Assessment


Assessment: 





5150


homeless


alcoholism


suicidal ideation


bipolar disorder


psychosis


BP stable today


Pressure ulcers -- Right Buttocks ... continue current treatment. 


constipation ... on stool softeners, KUB pending





- Plan


Plan: 





admit to geropsyche





will add catapres patch and clonidine PO q8 PRN SBP above 150





Wound care treatment. will add doxycycline PO





KUB taken, results pending





Nutritional Asmnt/Malnutr-PDOC





- Dietary Evaluation


Malnutrition Findings (Please click <Entered> for more info): 








Nutritional Asmnt/Malnutrition                             Start:  20 09:

34


Text:                                                      Status: Complete    

  


Freq:                                                                          

  


Protocol:                                                                      

  


 Document     20 09:39  JERI  (Rec: 20 10:01  JERI OCONNELL-

FNS1)


 Nutritional Asmnt/Malnutrition


     Patient General Information


      Nutritional Screening                      Low Risk


      Diagnosis                                  Psychosis


      Pertinent Medical Hx/Surgical Hx           Dementia, anxietry, bipolar


      Subjective Information                     Per H&P, patient is homeless


                                                 and admitted from


                                                 [facility] for


                                                 suicidal ideations, alcohol


                                                 abuse and 5150 hold for danger


                                                 to himself. Per RN, patient


                                                 not eating much, will often


                                                 only eat a cookie. She states


                                                 he mostly just drinks alcohol


                                                 at home and doesn't eat much.


                                                 Patient requesting strawberry


                                                 flavored protein supplements.


      Current Diet Order/ Nutrition Support      Regular


      Patient / S.O                              Not Indicated


      Pertinent Medications                      maalox, MOM, Theragran


      Pertinent Labs                             No current labs


     Nutritional Hx/Data


      Height                                     1.68 m


      Height (Calculated Centimeters)            167.6


      Current Weight (lbs)                       62.142 kg


      Weight (Calculated Kilograms)              62.1


      Weight (Calculated Grams)                  11986.2


      Ideal Body Weight                          142


      % Ideal Body Weight                        96


      Body Mass Index (BMI)                      22.1


      Recent Weight Change                       No


      Weight Status                              Approriate


     GI Symptoms


      GI Symptoms                                None


      Last BM                                    1/6 x 2


      Difficult in:                              None


      Food Allergies                             No


      Cultural/Ethnic/M[employer]on Belief           none indicated


      Usual diet at home                         regular (pt homeless)


      Skin Integrity/Comment:                    Andres Soliman, per wound care


                                                 notes, skin tear on right


                                                 outer buttock, right upper


                                                 pelvic rim and left upper


                                                 pelvic rim. Chronic wound on


                                                 right knee.


      Current %PO                                Poor (25-49%)


     Estimated Nutritional Goals


      BEE in Kcals:                              Using Current wt


      Calories/Kcals/Kg                          62.2kg 25-30 kcal/kg


      Kcals Calculated                           ~3522-7784 kcal/day


      Protein:                                   Using Current wt


      Protein g/k-1.2 gm/kg


      Protein Calculated                         60-75 gm/day


      Fluid: ml                                  ~7793-2045 ml/day (1 ml/kcal)


     Nutritional Problem


      1. Problem


       Problem                                   Inadequate oral intake related


                                                 to


       Etiology                                  possible poor appetite/


                                                 confusion aeb


       Signs/Symptoms:                           PO intake ~25% of meals


     Intervention/Recommendation


      Comments                                   1. Continue regular diet as


                                                 tolerated by patient


                                                 2. Encourage oral intake and


                                                 provide assistance with meals


                                                 as needed.


                                                 3. Provide Ensure Enlive (


                                                 Strawberry) with each meal to


                                                 optimize calorie/protein


                                                 intake.


                                                 4 Continue MVI supplementation


                                                 due to impaired skin


                                                 integrity.


     Expected Outcomes/Goals


      Expected Outcomes/Goals                    oral intake >75% of meals,


                                                 weight stable, nutrition


                                                 related labs WNL, improved


                                                 skin integrity


                                                 F/U MR

## 2020-01-14 NOTE — PROGRESS NOTES
DATE:  01/13/2020



SUBJECTIVE:  The patient withdrawn, mostly depressed, isolative to self, mostly

keeps to himself.  No behavioral outbursts, seems to be approaching his

baseline.  We have nowhere to send him to have bed availability on Wednesday of

this week.  He has been accepted to [facility] in [place]. 

Fair sleep, fair appetite.



PLAN:  We will continue to monitor closely.





DD: 01/13/2020 16:41

DT: 01/14/2020 00:52

JOB# [id number]  [id number]

## 2020-01-14 NOTE — PROGRESS NOTES
DATE:  01/14/2020



SUBJECTIVE:  The patient slept for about 7 hours, calm and cooperative on exam,

still preoccupied, depressed, knows where he is and why he is here, he knows the

year, is not quite sure about the month, the day of the week.  Seems to be

approaching his baseline.  We are waiting for a bed to open tomorrow at [facility].  Otherwise, we will try to discharge him tomorrow.  No behavioral

disturbances.  No evidence of any withdrawal symptoms.  No SI.





DD: 01/14/2020 06:34

DT: 01/14/2020 09:22

JOB# [id number]  [id number]

## 2020-01-14 NOTE — GENERAL PROGRESS NOTE
Subjective





- Review of Systems


Service Date: 20


Subjective: 





Patient is awake, alert. No acute distress. Currently on wound care treatment. 

BM today. Able to urinate 





VS


T 98.1


P 72


/74


R 20





Objective





- Physical Exam


Vitals and I&O: 


 Vital Signs











Temp  98.1 F   20 06:08


 


Pulse  72   20 06:08


 


Resp  20   20 07:32


 


BP  126/74   20 06:08


 


Pulse Ox  96   20 06:08








 Intake & Output











 20





 18:59 06:59 18:59


 


Intake Total 1200 240 


 


Output Total  1 


 


Balance 1200 239 


 


Intake:   


 


  Oral 1200 240 


 


Output:   


 


  Urine/Stool Mix  1 


 


Other:   


 


  # Voids 3 3 


 


  # Bowel Movements 2 0 











Active Medications: 


Current Medications





Acetaminophen (Tylenol)  650 mg PO Q4HR PRN


   PRN Reason: Mild Pain (Scale 1-3)


   Stop: 20 01:44


   Last Admin: 20 18:15 Dose:  650 mg


Acetaminophen (Tylenol)  650 mg PO Q4HR PRN


   PRN Reason: TEMP ABOVE 100


   Stop: 20 11:33


Al Hydrox/Mg Hydrox/Simethicone (Maalox)  30 ml PO Q4HR PRN


   PRN Reason: GI DISTRESS


   Stop: 20 01:44


Clonidine HCl (Catapres-Tts-1)  1 patch TD Mo [facility]


   Stop: 20 08:59


   Last Admin: 20 09:05 Dose:  1 patch


Doxycycline Hyclate (Vibramycin)  100 mg PO Q12HR [facility]


   Stop: 20 08:59


   Last Admin: 20 20:53 Dose:  100 mg


Lorazepam (Ativan)  0.5 mg PO Q4HR PRN; Protocol


   PRN Reason: Anxiety


   Stop: 20 01:44


   Last Admin: 20 21:27 Dose:  0.5 mg


Multivitamins/Vitamin C (Theragran)  1 tab PO DAILY [facility]


   Stop: 20 08:59


   Last Admin: 20 09:07 Dose:  1 tab


Trazodone HCl (Desyrel)  50 mg PO HS LUIS; Protocol


   Stop: 20 20:59


   Last Admin: 20 20:54 Dose:  50 mg








General: Alert, Oriented x3, No acute distress


HEENT: Atraumatic, PERRLA, EOMI


Neck: Supple


Cardiovascular: Regular rate


Lungs: Clear to auscultation


Abdomen: Bowel sounds, Soft, no Distended


Extremities: no Clubbing, no Cyanosis, no Edema





Assessment/Plan





- Assessment


Assessment: 





5150


homeless


alcoholism


suicidal ideation


bipolar disorder


psychosis


BP stable today


Pressure ulcers -- Right Buttocks ... continue current treatment. 


constipation ... on stool softeners


BPH ... on Flomax





- Plan


Plan: 





admit to geropsyche





will add catapres patch and clonidine PO q8 PRN SBP above 150





Wound care treatment. will add doxycycline PO





KUB taken, results pending





Nutritional Asmnt/Malnutr-PDOC





- Dietary Evaluation


Malnutrition Findings (Please click <Entered> for more info): 








Nutritional Asmnt/Malnutrition                             Start:  20 09:

34


Text:                                                      Status: Complete    

  


Freq:                                                                          

  


Protocol:                                                                      

  


 Document     20 09:39  JERI  (Rec: 20 10:01  JERI OCONNELL-

FNS1)


 Nutritional Asmnt/Malnutrition


     Patient General Information


      Nutritional Screening                      Low Risk


      Diagnosis                                  Psychosis


      Pertinent Medical Hx/Surgical Hx           Dementia, anxietry, bipolar


      Subjective Information                     Per H&P, patient is homeless


                                                 and admitted from


                                                 [facility] for


                                                 suicidal ideations, alcohol


                                                 abuse and 5150 hold for danger


                                                 to himself. Per RN, patient


                                                 not eating much, will often


                                                 only eat a cookie. She states


                                                 he mostly just drinks alcohol


                                                 at home and doesn't eat much.


                                                 Patient requesting strawberry


                                                 flavored protein supplements.


      Current Diet Order/ Nutrition Support      Regular


      Patient / S.O                              Not Indicated


      Pertinent Medications                      maalox, MOM, Theragran


      Pertinent Labs                             No current labs


     Nutritional Hx/Data


      Height                                     1.68 m


      Height (Calculated Centimeters)            167.6


      Current Weight (lbs)                       62.142 kg


      Weight (Calculated Kilograms)              62.1


      Weight (Calculated Grams)                  81785.2


      Ideal Body Weight                          142


      % Ideal Body Weight                        96


      Body Mass Index (BMI)                      22.1


      Recent Weight Change                       No


      Weight Status                              Approriate


     GI Symptoms


      GI Symptoms                                None


      Last BM                                    1/6 x 2


      Difficult in:                              None


      Food Allergies                             No


      Cultural/Ethnic/Jewish Belief           none indicated


      Usual diet at home                         regular (pt homeless)


      Skin Integrity/Comment:                    Andres Soliman, per wound care


                                                 notes, skin tear on right


                                                 outer buttock, right upper


                                                 pelvic rim and left upper


                                                 pelvic rim. Chronic wound on


                                                 right knee.


      Current %PO                                Poor (25-49%)


     Estimated Nutritional Goals


      BEE in Kcals:                              Using Current wt


      Calories/Kcals/Kg                          62.2kg 25-30 kcal/kg


      Kcals Calculated                           ~5437-6879 kcal/day


      Protein:                                   Using Current wt


      Protein g/k-1.2 gm/kg


      Protein Calculated                         60-75 gm/day


      Fluid: ml                                  ~5994-8473 ml/day (1 ml/kcal)


     Nutritional Problem


      1. Problem


       Problem                                   Inadequate oral intake related


                                                 to


       Etiology                                  possible poor appetite/


                                                 confusion aeb


       Signs/Symptoms:                           PO intake ~25% of meals


     Intervention/Recommendation


      Comments                                   1. Continue regular diet as


                                                 tolerated by patient


                                                 2. Encourage oral intake and


                                                 provide assistance with meals


                                                 as needed.


                                                 3. Provide Ensure Enlive (


                                                 Strawberry) with each meal to


                                                 optimize calorie/protein


                                                 intake.


                                                 4 Continue MVI supplementation


                                                 due to impaired skin


                                                 integrity.


     Expected Outcomes/Goals


      Expected Outcomes/Goals                    oral intake >75% of meals,


                                                 weight stable, nutrition


                                                 related labs WNL, improved


                                                 skin integrity


                                                 F/U MR

## 2020-01-15 NOTE — PROGRESS NOTES
DATE:  01/15/2020



SUBJECTIVE:  Case was discussed with staff of the patient, reviewed records. 

Covering for Dr. Gonzalez.  This 69-year-old male who was transferred from

[facility] [place] on a hold for being danger to self.  The patient

endorsed suicidal ideation with a plan to overdose on pills.  He has a long

history of alcohol abuse, homelessness, primary source of distress.  He has been

drinking over 20 years, unable to stop.  The patient has been depressed.  He is

on a detox protocol.  The patient continues to be depressed.  He knows where he

is and why he is here.  He knows the year.  He is not quite sure about the

month.  He is approaching his baseline.  He will be going to [facility] when he

is ready or they have a placement for him.  No side effects with the medication,

no sedation, no nausea, and we will continue to outpatient group therapy, milieu

therapy, and adjust medications as needed.





DD: 01/15/2020 11:27

DT: 01/15/2020 22:52

JOB# [id number]  [id number]

## 2020-01-15 NOTE — GENERAL PROGRESS NOTE
Subjective





- Review of Systems


Service Date: 01/15/20


Subjective: 





Patient is awake, alert. No acute distress. Currently on wound care treatment. 

BM today. Able to urinate 





VS


T 97.6


P 76


/69


R 19





Objective





- Physical Exam


Vitals and I&O: 


 Vital Signs











Temp  97.6 F   01/15/20 06:07


 


Pulse  76   01/15/20 06:07


 


Resp  19   01/15/20 06:07


 


BP  114/69   01/15/20 06:07


 


Pulse Ox  97   01/15/20 06:07








 Intake & Output











 01/14/20 01/15/20 01/15/20





 18:59 06:59 18:59


 


Intake Total 1300 120 


 


Balance 1300 120 


 


Intake:   


 


  Oral 1300 120 


 


Other:   


 


  # Voids 3 3 


 


  # Bowel Movements 0 0 











Active Medications: 


Current Medications





Acetaminophen (Tylenol)  650 mg PO Q4HR PRN


   PRN Reason: Mild Pain (Scale 1-3)


   Stop: 20 01:44


   Last Admin: 20 18:15 Dose:  650 mg


Acetaminophen (Tylenol)  650 mg PO Q4HR PRN


   PRN Reason: TEMP ABOVE 100


   Stop: 20 11:33


Al Hydrox/Mg Hydrox/Simethicone (Maalox)  30 ml PO Q4HR PRN


   PRN Reason: GI DISTRESS


   Stop: 20 01:44


Clonidine HCl (Catapres-Tts-1)  1 patch TD Mo [facility]


   Stop: 20 08:59


   Last Admin: 20 09:05 Dose:  1 patch


Doxycycline Hyclate (Vibramycin)  100 mg PO Q12HR [facility]


   Stop: 20 08:59


   Last Admin: 20 20:59 Dose:  100 mg


Lorazepam (Ativan)  0.5 mg PO Q4HR PRN; Protocol


   PRN Reason: Anxiety


   Stop: 20 01:44


   Last Admin: 20 21:27 Dose:  0.5 mg


Multivitamins/Vitamin C (Theragran)  1 tab PO DAILY [facility]


   Stop: 20 08:59


   Last Admin: 20 09:29 Dose:  1 tab


Tamsulosin HCl (Flomax)  0.4 mg PO DAILY [facility]


   Stop: 20 08:59


   Last Admin: 20 09:30 Dose:  0.4 mg


Trazodone HCl (Desyrel)  50 mg PO HS LUIS; Protocol


   Stop: 20 20:59


   Last Admin: 20 20:59 Dose:  50 mg








General: Alert, Oriented x3, No acute distress


HEENT: Atraumatic, PERRLA, EOMI


Neck: Supple


Cardiovascular: Regular rate


Lungs: Clear to auscultation


Abdomen: Bowel sounds, Soft, no Distended


Extremities: no Clubbing, no Cyanosis, no Edema





Assessment/Plan





- Assessment


Assessment: 





5150


homeless


alcoholism


suicidal ideation


bipolar disorder


psychosis


BP stable today


Pressure ulcers -- Right Buttocks ... continue current treatment. 


constipation ... on stool softeners


BPH ... on Flomax





- Plan


Plan: 





admit to geropsyche





will add catapres patch and clonidine PO q8 PRN SBP above 150





Wound care treatment. will add doxycycline PO





KUB taken, results pending





Nutritional Asmnt/Malnutr-PDOC





- Dietary Evaluation


Malnutrition Findings (Please click <Entered> for more info): 








Nutritional Asmnt/Malnutrition                             Start:  20 09:

34


Text:                                                      Status: Complete    

  


Freq:                                                                          

  


Protocol:                                                                      

  


 Document     20 09:39  JERI  (Rec: 20 10:01  JERI OCONNELL-

FNS1)


 Nutritional Asmnt/Malnutrition


     Patient General Information


      Nutritional Screening                      Low Risk


      Diagnosis                                  Psychosis


      Pertinent Medical Hx/Surgical Hx           Dementia, anxietry, bipolar


      Subjective Information                     Per H&P, patient is homeless


                                                 and admitted from


                                                 [facility] for


                                                 suicidal ideations, alcohol


                                                 abuse and 5150 hold for danger


                                                 to himself. Per RN, patient


                                                 not eating much, will often


                                                 only eat a cookie. She states


                                                 he mostly just drinks alcohol


                                                 at home and doesn't eat much.


                                                 Patient requesting strawberry


                                                 flavored protein supplements.


      Current Diet Order/ Nutrition Support      Regular


      Patient / S.O                              Not Indicated


      Pertinent Medications                      maalox, MOM, Theragran


      Pertinent Labs                             No current labs


     Nutritional Hx/Data


      Height                                     1.68 m


      Height (Calculated Centimeters)            167.6


      Current Weight (lbs)                       62.142 kg


      Weight (Calculated Kilograms)              62.1


      Weight (Calculated Grams)                  63964.2


      Ideal Body Weight                          142


      % Ideal Body Weight                        96


      Body Mass Index (BMI)                      22.1


      Recent Weight Change                       No


      Weight Status                              Approriate


     GI Symptoms


      GI Symptoms                                None


      Last BM                                    1/6 x 2


      Difficult in:                              None


      Food Allergies                             No


      Cultural/Ethnic/Jain Belief           none indicated


      Usual diet at home                         regular (pt homeless)


      Skin Integrity/Comment:                    Andres 17, per wound care


                                                 notes, skin tear on right


                                                 outer buttock, right upper


                                                 pelvic rim and left upper


                                                 pelvic rim. Chronic wound on


                                                 right knee.


      Current %PO                                Poor (25-49%)


     Estimated Nutritional Goals


      BEE in Kcals:                              Using Current wt


      Calories/Kcals/Kg                          62.2kg 25-30 kcal/kg


      Kcals Calculated                           ~2914-1494 kcal/day


      Protein:                                   Using Current wt


      Protein g/k-1.2 gm/kg


      Protein Calculated                         60-75 gm/day


      Fluid: ml                                  ~0608-7891 ml/day (1 ml/kcal)


     Nutritional Problem


      1. Problem


       Problem                                   Inadequate oral intake related


                                                 to


       Etiology                                  possible poor appetite/


                                                 confusion aeb


       Signs/Symptoms:                           PO intake ~25% of meals


     Intervention/Recommendation


      Comments                                   1. Continue regular diet as


                                                 tolerated by patient


                                                 2. Encourage oral intake and


                                                 provide assistance with meals


                                                 as needed.


                                                 3. Provide Ensure Enlive (


                                                 Strawberry) with each meal to


                                                 optimize calorie/protein


                                                 intake.


                                                 4 Continue MVI supplementation


                                                 due to impaired skin


                                                 integrity.


     Expected Outcomes/Goals


      Expected Outcomes/Goals                    oral intake >75% of meals,


                                                 weight stable, nutrition


                                                 related labs WNL, improved


                                                 skin integrity


                                                 F/U MR -